# Patient Record
Sex: MALE | Race: WHITE | NOT HISPANIC OR LATINO | Employment: OTHER | URBAN - METROPOLITAN AREA
[De-identification: names, ages, dates, MRNs, and addresses within clinical notes are randomized per-mention and may not be internally consistent; named-entity substitution may affect disease eponyms.]

---

## 2023-09-27 ENCOUNTER — TELEPHONE (OUTPATIENT)
Dept: GASTROENTEROLOGY | Facility: CLINIC | Age: 68
End: 2023-09-27
Payer: MEDICARE

## 2023-09-27 NOTE — TELEPHONE ENCOUNTER
I call back to schedule C-scope. He doesn't answer. I leave him a brief vm. Callback number provided.

## 2023-09-27 NOTE — TELEPHONE ENCOUNTER
----- Message from Reyna Cadena LPN sent at 9/26/2023  4:22 PM CDT -----  Contact: Self    ----- Message -----  From: Lucila Rodriguez  Sent: 9/26/2023   4:19 PM CDT  To: [facility] Clinical Staff    Type: Appointment Request    Name of Caller:  Pt   When is the first available appointment?  NA  Best Call Back Number:  [phone]    Additional Information:  Routine Colonoscopy

## 2023-09-27 NOTE — TELEPHONE ENCOUNTER
Colonoscopy is scheduled on 11/14 with Dr. Tapia at [address] [place] [address] in [place]. After our Pre-service team gets the green light from your insurance, the Preop Nurse will call a couple of days before your procedure date with the arrival time.    Prep instructions has been sent via [facility] and via mail. Address is confirmed. Patient is informed the preop Nurse will call him/her with the arrival time a day or two prior to procedure. Patient verbalizes understanding.     Pt would like Jeb

## 2023-11-13 ENCOUNTER — TELEPHONE (OUTPATIENT)
Dept: GASTROENTEROLOGY | Facility: CLINIC | Age: 68
End: 2023-11-13
Payer: MEDICARE

## 2023-11-13 NOTE — TELEPHONE ENCOUNTER
----- Message from Cynthia Atwood sent at 11/13/2023 10:49 AM CST -----  Type:  Needs Medical Advice    Who Called: PT    Symptoms (please be specific): NA     How long has patient had these symptoms:  NA    Pharmacy name and phone #:    [id number] - [place], [place] - [address] [facility]  [address] [facility] [address]  Phone: [phone] Fax: [phone]        Would the patient rather a call back or a response via [facility]? CALL BACK    Best Call Back Number: [phone]      Additional Information: pt would like to cancel appt for his procedure tomorrow bc no one has called him to confirm a time of the procedure. He said he is done and will find somewhere else to go.      Please call Back to advise. Thanks!

## 2023-11-13 NOTE — TELEPHONE ENCOUNTER
----- Message from Sarah Rodríguez sent at 11/13/2023  9:20 AM CST -----  Type: Needs Medical Advice  Who Called:  pt     Best Call Back Number: [phone]    Additional Information: pt requesting call back in regards to procedure please advise

## 2023-11-29 PROBLEM — R97.20 ELEVATED PROSTATE SPECIFIC ANTIGEN (PSA): Status: ACTIVE | Noted: 2023-11-29

## 2024-02-02 PROBLEM — C61 PROSTATE CANCER: Status: ACTIVE | Noted: 2024-02-02
